# Patient Record
Sex: MALE | Race: WHITE | Employment: UNEMPLOYED | URBAN - METROPOLITAN AREA
[De-identification: names, ages, dates, MRNs, and addresses within clinical notes are randomized per-mention and may not be internally consistent; named-entity substitution may affect disease eponyms.]

---
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
## 2022-01-13 PROBLEM — F33.2 MDD (MAJOR DEPRESSIVE DISORDER), RECURRENT EPISODE, SEVERE (HCC): Status: ACTIVE | Noted: 2022-01-13

## 2022-01-14 PROBLEM — F33.2 MAJOR DEPRESSIVE DISORDER, RECURRENT SEVERE WITHOUT PSYCHOTIC FEATURES (HCC): Status: ACTIVE | Noted: 2022-01-13

## 2022-02-02 ENCOUNTER — HOSPITAL ENCOUNTER (EMERGENCY)
Facility: HOSPITAL | Age: 22
Discharge: HOME | End: 2022-02-02
Attending: EMERGENCY MEDICINE
Payer: COMMERCIAL

## 2022-02-02 VITALS
DIASTOLIC BLOOD PRESSURE: 71 MMHG | BODY MASS INDEX: 19.22 KG/M2 | WEIGHT: 145 LBS | HEART RATE: 51 BPM | SYSTOLIC BLOOD PRESSURE: 135 MMHG | OXYGEN SATURATION: 100 % | RESPIRATION RATE: 16 BRPM | TEMPERATURE: 99 F | HEIGHT: 73 IN

## 2022-02-02 DIAGNOSIS — F32.A DEPRESSION, UNSPECIFIED DEPRESSION TYPE: Primary | ICD-10-CM

## 2022-02-02 PROCEDURE — 99283 EMERGENCY DEPT VISIT LOW MDM: CPT

## 2022-02-03 NOTE — ED INITIAL ASSESSMENT (HPI)
Pt brought in by EMS. Pt was telling his friend he had thought of cutting words into his arm such as \"pathetic, worthless, and stupid. \" recently discharged from [facility] for SI thoughts. Pt denies SI plan. Calm and cooperative.

## 2025-07-18 ENCOUNTER — HOSPITAL ENCOUNTER (EMERGENCY)
Facility: HOSPITAL | Age: 25
Discharge: HOME | End: 2025-07-19
Attending: EMERGENCY MEDICINE
Payer: COMMERCIAL

## 2025-07-18 ENCOUNTER — APPOINTMENT (OUTPATIENT)
Dept: GENERAL RADIOLOGY | Facility: HOSPITAL | Age: 25
End: 2025-07-18
Payer: COMMERCIAL

## 2025-07-18 DIAGNOSIS — M54.6 ACUTE LEFT-SIDED THORACIC BACK PAIN: ICD-10-CM

## 2025-07-18 DIAGNOSIS — S46.812A STRAIN OF LEFT TRAPEZIUS MUSCLE, INITIAL ENCOUNTER: Primary | ICD-10-CM

## 2025-07-18 LAB
BASOPHILS # BLD AUTO: 0.06 X10(3) UL (ref 0–0.2)
BASOPHILS NFR BLD AUTO: 0.5 %
EOSINOPHIL # BLD AUTO: 0.06 X10(3) UL (ref 0–0.7)
EOSINOPHIL NFR BLD AUTO: 0.5 %
ERYTHROCYTE [DISTWIDTH] IN BLOOD BY AUTOMATED COUNT: 11.7 %
HCT VFR BLD AUTO: 43.3 % (ref 39–53)
HGB BLD-MCNC: 15.3 G/DL (ref 13–17.5)
IMM GRANULOCYTES # BLD AUTO: 0.05 X10(3) UL (ref 0–1)
IMM GRANULOCYTES NFR BLD: 0.4 %
LYMPHOCYTES # BLD AUTO: 1.9 X10(3) UL (ref 1–4)
LYMPHOCYTES NFR BLD AUTO: 14.4 %
MCH RBC QN AUTO: 28.7 PG (ref 26–34)
MCHC RBC AUTO-ENTMCNC: 35.3 G/DL (ref 31–37)
MCV RBC AUTO: 81.2 FL (ref 80–100)
MONOCYTES # BLD AUTO: 0.66 X10(3) UL (ref 0.1–1)
MONOCYTES NFR BLD AUTO: 5 %
NEUTROPHILS # BLD AUTO: 10.42 X10 (3) UL (ref 1.5–7.7)
NEUTROPHILS # BLD AUTO: 10.42 X10(3) UL (ref 1.5–7.7)
NEUTROPHILS NFR BLD AUTO: 79.2 %
PLATELET # BLD AUTO: 261 10(3)UL (ref 150–450)
RBC # BLD AUTO: 5.33 X10(6)UL (ref 4.3–5.7)
WBC # BLD AUTO: 13.2 X10(3) UL (ref 4–11)

## 2025-07-18 PROCEDURE — 93010 ELECTROCARDIOGRAM REPORT: CPT

## 2025-07-18 PROCEDURE — 71046 X-RAY EXAM CHEST 2 VIEWS: CPT

## 2025-07-18 PROCEDURE — 85379 FIBRIN DEGRADATION QUANT: CPT | Performed by: EMERGENCY MEDICINE

## 2025-07-18 PROCEDURE — 93005 ELECTROCARDIOGRAM TRACING: CPT

## 2025-07-18 PROCEDURE — 85025 COMPLETE CBC W/AUTO DIFF WBC: CPT | Performed by: EMERGENCY MEDICINE

## 2025-07-18 PROCEDURE — 99285 EMERGENCY DEPT VISIT HI MDM: CPT

## 2025-07-18 PROCEDURE — 36415 COLL VENOUS BLD VENIPUNCTURE: CPT

## 2025-07-18 PROCEDURE — 99284 EMERGENCY DEPT VISIT MOD MDM: CPT

## 2025-07-18 PROCEDURE — 80053 COMPREHEN METABOLIC PANEL: CPT | Performed by: EMERGENCY MEDICINE

## 2025-07-18 PROCEDURE — 84484 ASSAY OF TROPONIN QUANT: CPT | Performed by: EMERGENCY MEDICINE

## 2025-07-19 VITALS
SYSTOLIC BLOOD PRESSURE: 106 MMHG | TEMPERATURE: 98 F | RESPIRATION RATE: 12 BRPM | DIASTOLIC BLOOD PRESSURE: 54 MMHG | HEART RATE: 73 BPM | OXYGEN SATURATION: 100 % | BODY MASS INDEX: 20 KG/M2 | WEIGHT: 148 LBS

## 2025-07-19 LAB
ALBUMIN SERPL-MCNC: 5.5 G/DL (ref 3.2–4.8)
ALBUMIN/GLOB SERPL: 2 (ref 1–2)
ALP LIVER SERPL-CCNC: 84 U/L (ref 45–117)
ALT SERPL-CCNC: 14 U/L (ref 10–49)
ANION GAP SERPL CALC-SCNC: 12 MMOL/L (ref 0–18)
AST SERPL-CCNC: 19 U/L (ref ?–34)
ATRIAL RATE: 95 BPM
BILIRUB SERPL-MCNC: 2.6 MG/DL (ref 0.3–1.2)
BUN BLD-MCNC: 10 MG/DL (ref 9–23)
CALCIUM BLD-MCNC: 10.4 MG/DL (ref 8.7–10.6)
CHLORIDE SERPL-SCNC: 103 MMOL/L (ref 98–112)
CO2 SERPL-SCNC: 25 MMOL/L (ref 21–32)
CREAT BLD-MCNC: 1.4 MG/DL (ref 0.7–1.3)
D DIMER PPP FEU-MCNC: <0.27 UG/ML FEU (ref ?–0.5)
EGFRCR SERPLBLD CKD-EPI 2021: 73 ML/MIN/1.73M2 (ref 60–?)
GLOBULIN PLAS-MCNC: 2.7 G/DL (ref 2–3.5)
GLUCOSE BLD-MCNC: 140 MG/DL (ref 70–99)
OSMOLALITY SERPL CALC.SUM OF ELEC: 291 MOSM/KG (ref 275–295)
P AXIS: 49 DEGREES
P-R INTERVAL: 152 MS
POTASSIUM SERPL-SCNC: 3.7 MMOL/L (ref 3.5–5.1)
PROT SERPL-MCNC: 8.2 G/DL (ref 5.7–8.2)
Q-T INTERVAL: 358 MS
QRS DURATION: 106 MS
QTC CALCULATION (BEZET): 449 MS
R AXIS: 60 DEGREES
SODIUM SERPL-SCNC: 140 MMOL/L (ref 136–145)
T AXIS: 31 DEGREES
TROPONIN I SERPL HS-MCNC: <3 NG/L (ref ?–53)
VENTRICULAR RATE: 95 BPM

## 2025-07-19 NOTE — ED INITIAL ASSESSMENT (HPI)
Left shoulder/upper back pain. Similar pain to his previous collapsed lung. Pain causes trouble taking deep breath, no chest pain.  Denies injury/trauma, no sudden movements. Pt states he was sitting at his computer during onset of pain.

## 2025-07-19 NOTE — DISCHARGE INSTRUCTIONS
Follow-up with your primary care doctor in the next week for reassessment.  Return for new or worsening pain, difficulty breathing or any other concerning symptoms.

## 2025-07-19 NOTE — ED PROVIDER NOTES
Patient Seen in: [facility] Emergency Department        History  Chief Complaint   Patient presents with    Back Pain     Stated Complaint: Intense upper back pain    Subjective:   HPI            24-year-old male with previous history of spontaneous pneumothorax secondary to a bleb presents today for evaluation.  Just prior to arrival, patient had a pain in his left upper back/trap and a mild discomfort in his left lateral chest.  Since onset, the pain has completely resolved.  He denied any known trauma.  He denied any recent travel, leg swelling, fevers, cough or hemoptysis.  He has not any shortness of breath.      Objective:     Past Medical History:    Lyme arthritis (HCC)    Primary spontaneous pneumothorax    SVT (supraventricular tachycardia) (HCC)    resolved    Tourette's    Per father              History reviewed. No pertinent surgical history.             Social History     Socioeconomic History    Marital status: Single   Tobacco Use    Smoking status: Never    Smokeless tobacco: Never   Vaping Use    Vaping status: Never Used   Substance and Sexual Activity    Alcohol use: No     Alcohol/week: 0.0 standard drinks of alcohol    Drug use: No                                Physical Exam    ED Triage Vitals [07/18/25 2128]   /63   Pulse 71   Resp 18   Temp 98 °F (36.7 °C)   Temp src    SpO2 92 %   O2 Device None (Room air)       Current Vitals:   Vital Signs  BP: 106/54  Pulse: 73  Resp: 12  Temp: 98 °F (36.7 °C)  MAP (mmHg): 71    Oxygen Therapy  SpO2: 100 %  O2 Device: None (Room air)            Physical Exam  Vitals and nursing note reviewed.   Constitutional:       Appearance: Normal appearance.   HENT:      Head: Normocephalic.      Nose: Nose normal.      Mouth/Throat:      Mouth: Mucous membranes are moist.   Eyes:      Extraocular Movements: Extraocular movements intact.   Cardiovascular:      Rate and Rhythm: Normal rate and regular rhythm.   Pulmonary:      Effort: Pulmonary effort  is normal.      Breath sounds: Normal breath sounds.   Abdominal:      General: Abdomen is flat.   Musculoskeletal:         General: Normal range of motion.      Right lower leg: No edema.      Left lower leg: No edema.   Skin:     General: Skin is warm.   Neurological:      General: No focal deficit present.      Mental Status: He is alert.   Psychiatric:         Mood and Affect: Mood normal.             ED Course  Labs Reviewed   CBC WITH DIFFERENTIAL WITH PLATELET - Abnormal; Notable for the following components:       Result Value    WBC 13.2 (*)     Neutrophil Absolute Prelim 10.42 (*)     Neutrophil Absolute 10.42 (*)     All other components within normal limits   COMP METABOLIC PANEL (14) - Abnormal; Notable for the following components:    Glucose 140 (*)     Creatinine 1.40 (*)     Bilirubin, Total 2.6 (*)     Albumin 5.5 (*)     All other components within normal limits   TROPONIN I HIGH SENSITIVITY - Normal   D-DIMER - Normal     EKG    Rate, intervals and axes as noted on EKG Report.  Rate: 95  Rhythm: Sinus Rhythm  Reading: no stemi              XR CHEST PA + LAT CHEST (OMP=68275)  Result Date: 7/18/2025  PROCEDURE: XR CHEST PA + LAT CHEST (CPT=71046) INDICATIONS: Intense upper back pain COMPARISON: 8/6/2021 FINDINGS: The cardiac silhouette is within normal limits. There is no focal consolidation, effusion, or pneumothorax. No aggressive osseous lesions are identified.       CONCLUSION:  1.  No acute cardiopulmonary abnormality Electronically Verified and Signed by Attending Radiologist: Héctor Potter MD 7/18/2025 10:21 PM Workstation: [id number]                      [facility]     Differential Diagnosis  24-year-old male presents today for evaluation of a left upper trap pain.  His lungs are clear and his work of breathing is nonlabored.  Heart rate ranging between 95 and 109 during my encounter.  Differential includes pneumothorax, pleurisy, trapezius muscle strain, pulmonary embolism, myocarditis.  Plan  for x-ray along with labs.    1:07 am  ED workup does not demonstrate significant acute abnormality.  On reassessment, patient has improvement to his pain.  With his reassuring workup and clinical improvement, I do feel patient is stable for discharge home.  I discussed the possibility of chest wall or muscular pain as a source of his presenting symptoms.  I advised outpatient follow-up and return for any worsening symptoms.    Independent Interpretation  I independently interpreted x-ray, no significant pneumothorax noted        Medical Decision Making      Disposition and Plan     Clinical Impression:  1. Strain of left trapezius muscle, initial encounter    2. Acute left-sided thoracic back pain         Disposition:  Discharge  7/19/2025  1:08 am    Follow-up:  Varghese Whitehead MD  [address]  [address]  [place] [id number] [phone]    Call in 1 day(s)            Medications Prescribed:  Discharge Medication List as of 7/19/2025  1:09 AM                Supplementary Documentation: